# Patient Record
Sex: FEMALE | Race: WHITE | Employment: UNEMPLOYED | URBAN - METROPOLITAN AREA
[De-identification: names, ages, dates, MRNs, and addresses within clinical notes are randomized per-mention and may not be internally consistent; named-entity substitution may affect disease eponyms.]

---

## 2018-04-26 ENCOUNTER — OFFICE VISIT (OUTPATIENT)
Dept: FAMILY MEDICINE | Facility: CLINIC | Age: 43
End: 2018-04-26
Payer: COMMERCIAL

## 2018-04-26 VITALS
RESPIRATION RATE: 16 BRPM | WEIGHT: 162 LBS | HEART RATE: 84 BPM | BODY MASS INDEX: 29.81 KG/M2 | SYSTOLIC BLOOD PRESSURE: 122 MMHG | HEIGHT: 62 IN | DIASTOLIC BLOOD PRESSURE: 82 MMHG | TEMPERATURE: 98.2 F | OXYGEN SATURATION: 98 %

## 2018-04-26 DIAGNOSIS — Z00.00 ENCOUNTER FOR ROUTINE ADULT HEALTH EXAMINATION WITHOUT ABNORMAL FINDINGS: Primary | ICD-10-CM

## 2018-04-26 DIAGNOSIS — Z30.09 ENCOUNTER FOR OTHER GENERAL COUNSELING OR ADVICE ON CONTRACEPTION: ICD-10-CM

## 2018-04-26 DIAGNOSIS — Z01.411 ABNORMAL PELVIC EXAM: ICD-10-CM

## 2018-04-26 DIAGNOSIS — D50.0 IRON DEFICIENCY ANEMIA DUE TO CHRONIC BLOOD LOSS: ICD-10-CM

## 2018-04-26 DIAGNOSIS — N92.0 MENORRHAGIA WITH REGULAR CYCLE: ICD-10-CM

## 2018-04-26 DIAGNOSIS — C44.310 BCC (BASAL CELL CARCINOMA), FACE: ICD-10-CM

## 2018-04-26 LAB
CHOLEST SERPL-MCNC: 234 MG/DL
ERYTHROCYTE [DISTWIDTH] IN BLOOD BY AUTOMATED COUNT: 14 % (ref 10–15)
FERRITIN SERPL-MCNC: 4 NG/ML (ref 12–150)
GLUCOSE SERPL-MCNC: 101 MG/DL (ref 70–99)
HCT VFR BLD AUTO: 35.6 % (ref 35–47)
HDLC SERPL-MCNC: 59 MG/DL
HGB BLD-MCNC: 11.4 G/DL (ref 11.7–15.7)
LDLC SERPL CALC-MCNC: 144 MG/DL
MCH RBC QN AUTO: 27.6 PG (ref 26.5–33)
MCHC RBC AUTO-ENTMCNC: 32 G/DL (ref 31.5–36.5)
MCV RBC AUTO: 86 FL (ref 78–100)
NONHDLC SERPL-MCNC: 175 MG/DL
PLATELET # BLD AUTO: 299 10E9/L (ref 150–450)
RBC # BLD AUTO: 4.13 10E12/L (ref 3.8–5.2)
TRIGL SERPL-MCNC: 157 MG/DL
TSH SERPL DL<=0.005 MIU/L-ACNC: 1.02 MU/L (ref 0.4–4)
WBC # BLD AUTO: 4.8 10E9/L (ref 4–11)

## 2018-04-26 PROCEDURE — 36415 COLL VENOUS BLD VENIPUNCTURE: CPT | Performed by: FAMILY MEDICINE

## 2018-04-26 PROCEDURE — 99213 OFFICE O/P EST LOW 20 MIN: CPT | Mod: 25 | Performed by: FAMILY MEDICINE

## 2018-04-26 PROCEDURE — 82947 ASSAY GLUCOSE BLOOD QUANT: CPT | Performed by: FAMILY MEDICINE

## 2018-04-26 PROCEDURE — 84443 ASSAY THYROID STIM HORMONE: CPT | Performed by: FAMILY MEDICINE

## 2018-04-26 PROCEDURE — 85027 COMPLETE CBC AUTOMATED: CPT | Performed by: FAMILY MEDICINE

## 2018-04-26 PROCEDURE — 82728 ASSAY OF FERRITIN: CPT | Performed by: FAMILY MEDICINE

## 2018-04-26 PROCEDURE — 80061 LIPID PANEL: CPT | Performed by: FAMILY MEDICINE

## 2018-04-26 PROCEDURE — 99396 PREV VISIT EST AGE 40-64: CPT | Performed by: FAMILY MEDICINE

## 2018-04-26 ASSESSMENT — PAIN SCALES - GENERAL: PAINLEVEL: NO PAIN (0)

## 2018-04-26 NOTE — PATIENT INSTRUCTIONS
-Please scheduled to get a mammogram at your convenience.  -Continue to consider whether you are interested in getting an IUD, and let me know if you decide you would like one.  -It would be a good idea to schedule a skin check at your convenience.    Please call [facility] (formerly called [facility]) at [phone] to schedule mammogram and pelvic ultrasound      Start powdered fiber such as Metamucil or Citricel: start 1 tsp per day, and increase by 1 tsp per week to goal total dosing of 1-2 tablespoons per day. Drink plenty of water daily for fiber to be effective.       Preventive Health Recommendations  Female Ages 40 to 49    Yearly exam:     See your health care provider every year in order to  1. Review health changes.   2. Discuss preventive care.    3. Review your medicines if your doctor prescribed any.      Get a Pap test every three years (unless you have an abnormal result and your provider advises testing more often).      If you get Pap tests with HPV test, you only need to test every 5 years, unless you have an abnormal result. You do not need a Pap test if your uterus was removed (hysterectomy) and you have not had cancer.      You should be tested each year for STDs (sexually transmitted diseases), if you're at risk.       Ask your doctor if you should have a mammogram.      Have a colonoscopy (test for colon cancer) if someone in your family has had colon cancer or polyps before age 50.       Have a cholesterol test every 5 years.       Have a diabetes test (fasting glucose) after age 45. If you are at risk for diabetes, you should have this test every 3 years.    Shots: Get a flu shot each year. Get a tetanus shot every 10 years.     Nutrition:     Eat at least 5 servings of fruits and vegetables each day.    Eat whole-grain bread, whole-wheat pasta and brown rice instead of white grains and rice.    Talk to your provider about Calcium and  Vitamin D.     Lifestyle    Exercise at least 150 minutes a week (an average of 30 minutes a day, 5 days a week). This will help you control your weight and prevent disease.    Limit alcohol to one drink per day.    No smoking.     Wear sunscreen to prevent skin cancer.    See your dentist every six months for an exam and cleaning.

## 2018-04-26 NOTE — PROGRESS NOTES
SUBJECTIVE:   CC: Mat Lei is an 42 year old woman who presents for preventive health visit.     Heavy Menstruation:  Patient has recently begun using a menstrual cup. She says that on average she loses 5-6 ounces of blood with menstruation. When she used pads, it took her 3-4 hours to saturate a pad although she thinks her cycle is heavier now than when using the pads. With menstruation, her bleeding is heavy for the first 3 days and then is light for a few days, which she says has changed over the last year where she was initially having lighter, constant bleeding for 7 days. History of iron deficiency anemia.    Birth Control:  Patient is sexually active and uses condoms for birth control. She has used birth control pills in the past and reports problems with pain in her left shoulder while on the medication so doesn't want to go back to ocps. She and her  have decided that they would not like anymore children. They are considering permanent birth control. No history of IUD use, and she says she may be interested in trying one. Patient has had 4 children, and she had pre-eclampsia with her first child and delivered 3 weeks early through .    Swelling of Left Axilla:  Patient was recently menstruating when she developed pain and swelling under her left armpit. She has experienced swelling in her axilla in the past with colds, and says it resolves within a week or so. No changes in tenderness or masses in her breasts. Family history of breast cancer through her maternal grandfather. She has not ever had a mammogram, and asks when she should begin screening.    Weight:  Patient says that she has been trying to exercise regularly, but says that her motivation to exercise comes and goes. She doesn't check her weight, and says that she prefers to focus on healthy living. She eats a regular diet, and is not keeping track of what she eats.    History of Basal Cell Carcinoma:  Patient has a  "history \"within the last 5 years\" of a basal cell carcinoma over the left side of her forehead. She has had a Mohs procedure to remove the basal cell carcinoma. She has not been having annual skin checks.    Coccyx Pain:  Patient says that in early March she was skiing and fell on her tailbone. She had pain over her backside for 3 weeks. She says that her pain has since resolved, and has had no problems over the last month.    Healthy Habits:    Do you get at least three servings of calcium containing foods daily (dairy, green leafy vegetables, etc.)? yes    Amount of exercise or daily activities, outside of work: 2-5 day(s) per week    Problems taking medications regularly not applicable    Medication side effects: No    Have you had an eye exam in the past two years? no    Do you see a dentist twice per year? no    Do you have sleep apnea, excessive snoring or daytime drowsiness?no      Today's PHQ-2 Score:   PHQ-2 ( 1999 Pf[employer]) 4/26/2018 9/18/2014   Q1: Little interest or pleasure in doing things 1 0   Q2: Feeling down, depressed or hopeless 1 0   PHQ-2 Score 2 0       Abuse: Current or Past(Physical, Sexual or Emotional)- No  Do you feel safe in your environment - Yes    Social History   Substance Use Topics     Smoking status: Never Smoker     Smokeless tobacco: Never Used     Alcohol use No     If you drink alcohol do you typically have >3 drinks per day or >7 drinks per week? No                     Reviewed orders with patient.  Reviewed health maintenance and updated orders accordingly - Yes  Patient Active Problem List   Diagnosis     CARDIOVASCULAR SCREENING; LDL GOAL LESS THAN 160     BMI 30.0-30.9,adult     History reviewed. No pertinent surgical history.    Social History   Substance Use Topics     Smoking status: Never Smoker     Smokeless tobacco: Never Used     Alcohol use No     Family History   Problem Relation Age of Onset     Breast Cancer Maternal Grandfather      knows had breast cancer not " sure if thats where it started         Current Outpatient Prescriptions   Medication Sig Dispense Refill     Prenatal Vit-Fe Fumarate-FA (PRENATAL MULTIVITAMIN  PLUS IRON) 27-0.8 MG TABS Take 1 tablet by mouth daily 100 tablet 3     NO ACTIVE MEDICATIONS .       No Known Allergies    Patient under age 50, mutual decision reflected in health maintenance.      Pertinent mammograms are reviewed under the imaging tab.  History of abnormal Pap smear:   NO - age 30- 65 PAP every 3 years recommended  Last 3 Pap Results:   PAP (no units)   Date Value   10/03/2016 NIL   09/13/2012 NIL       Reviewed and updated as needed this visit by clinical staff  Tobacco  Allergies  Meds  Med Hx  Surg Hx  Fam Hx  Soc Hx        Reviewed and updated as needed this visit by Provider            ROS:  CONSTITUTIONAL: NEGATIVE for fever, chills, change in weight  INTEGUMENTARU/SKIN: NEGATIVE for worrisome rashes, moles or lesions  EYES: NEGATIVE for vision changes or irritation  ENT: NEGATIVE for ear, mouth and throat problems  RESP: NEGATIVE for significant cough or SOB  BREAST: POSITIVE for swelling of left axilla, now resolved NEGATIVE for tenderness or discharge  CV: NEGATIVE for chest pain, palpitations or peripheral edema  GI: NEGATIVE for nausea, abdominal pain, heartburn, or change in bowel habits  : NEGATIVE for unusual urinary or vaginal symptoms. Periods are regular.  MUSCULOSKELETAL: NEGATIVE for significant arthralgias or myalgia  NEURO: NEGATIVE for weakness, dizziness or paresthesias  PSYCHIATRIC: NEGATIVE for changes in mood or affect    This document serves as a record of the services and decisions personally performed and made by Lexy Deleon MD. It was created on his behalf by Wayne Correa, a trained medical scribe. The creation of this document is based on the provider's statements to the medical scribe.  Wayne Correa 8:52 AM April 26, 2018      OBJECTIVE:   /82 (BP Location: Right  "arm, Patient Position: Sitting, Cuff Size: Adult Regular)  Pulse 84  Temp 98.2  F (36.8  C) (Oral)  Resp 16  Ht 1.562 m (5' 1.5\")  Wt 73.5 kg (162 lb)  LMP 04/14/2018  SpO2 98%  BMI 30.11 kg/m2  EXAM:  GENERAL: healthy, alert and no distress  EYES: Eyes grossly normal to inspection, PERRL and conjunctivae and sclerae normal  HENT: ear canals and TM's normal, nose and mouth without ulcers or lesions  NECK: no adenopathy, no asymmetry, masses, or scars and thyroid normal to palpation  RESP: lungs clear to auscultation - no rales, rhonchi or wheezes  BREAST: normal without masses, tenderness or nipple discharge and no palpable axillary masses or adenopathy  CV: regular rate and rhythm, normal S1 S2, no S3 or S4, no murmur, click or rub, no peripheral edema and peripheral pulses strong  ABDOMEN: soft, nontender, no hepatosplenomegaly, no masses and bowel sounds normal   (female): uterus felt slightly enlarged and bulky, normal female external genitalia, normal urethral meatus, vaginal mucosa pink, moist, well rugated, and normal cervix/adnexa/uterus without masses or discharge  MS: no gross musculoskeletal defects noted, no edema  SKIN: mild hyperpigmentation of axilla  NEURO: Normal strength and tone, mentation intact and speech normal  PSYCH: mentation appears normal, affect normal/bright  LYMPH: no cervical, supraclavicular, axillary, or inguinal adenopathy    ASSESSMENT/PLAN:   1. Encounter for routine adult health examination without abnormal findings  - *MA Screening Digital Bilateral; Future  - Lipid panel reflex to direct LDL Fasting  - Glucose    2. Menorrhagia with regular cycle  - TSH with free T4 reflex  - CBC with platelets  - Ferritin  - US Pelvic Complete w Transvaginal; Future    3. BCC (basal cell carcinoma), face  Encouraged patient to follow through with annual skin check with derm- she will call to schedule (declines referral today)    4. Encounter for other general counseling or advice on " contraception  Discussed Mirena IUD, vasectomy, tubal ligation, etc and patient will consider. She and her partner have decided that they do not want more children. She will call if wanting to schedule Mirena insertion    5. Abnormal pelvic exam  - US Pelvic Complete w Transvaginal; Future    Patient Instructions   -Please scheduled to get a mammogram at your convenience.  -Continue to consider whether you are interested in getting an IUD, and let me know if you decide you would like one.  -It would be a good idea to schedule a skin check at your convenience.    Please call [facility] (formerly [facility]) at [phone] to schedule mammogram and pelvic ultrasound      Start powdered fiber such as Metamucil or Citricel: start 1 tsp per day, and increase by 1 tsp per week to goal total dosing of 1-2 tablespoons per day. Drink plenty of water daily for fiber to be effective.       Preventive Health Recommendations  Female Ages 40 to 49    Yearly exam:     See your health care provider every year in order to  1. Review health changes.   2. Discuss preventive care.    3. Review your medicines if your doctor prescribed any.      Get a Pap test every three years (unless you have an abnormal result and your provider advises testing more often).      If you get Pap tests with HPV test, you only need to test every 5 years, unless you have an abnormal result. You do not need a Pap test if your uterus was removed (hysterectomy) and you have not had cancer.      You should be tested each year for STDs (sexually transmitted diseases), if you're at risk.       Ask your doctor if you should have a mammogram.      Have a colonoscopy (test for colon cancer) if someone in your family has had colon cancer or polyps before age 50.       Have a cholesterol test every 5 years.       Have a diabetes test (fasting glucose) after age 45. If you are at risk for diabetes, you should have  "this test every 3 years.    Shots: Get a flu shot each year. Get a tetanus shot every 10 years.     Nutrition:     Eat at least 5 servings of fruits and vegetables each day.    Eat whole-grain bread, whole-wheat pasta and brown rice instead of white grains and rice.    Talk to your provider about Calcium and Vitamin D.     Lifestyle    Exercise at least 150 minutes a week (an average of 30 minutes a day, 5 days a week). This will help you control your weight and prevent disease.    Limit alcohol to one drink per day.    No smoking.     Wear sunscreen to prevent skin cancer.    See your dentist every six months for an exam and cleaning.      COUNSELING:   Reviewed preventive health counseling, as reflected in patient instructions     reports that she has never smoked. She has never used smokeless tobacco.  Estimated body mass index is 30.11 kg/(m^2) as calculated from the following:    Height as of this encounter: 1.562 m (5' 1.5\").    Weight as of this encounter: 73.5 kg (162 lb).   Weight management plan: Encouraged regular exercise and eating a healthy diet.    Counseling Resources:  ATP IV Guidelines  Pooled Cohorts Equation Calculator  Breast Cancer Risk Calculator  FRAX Risk Assessment  ICSI Preventive Guidelines  Dietary Guidelines for Americans, 2010  USDA's MyPlate  ASA Prophylaxis  Lung CA Screening    The information in this document, created by the medical scribe for me, accurately reflects the services I personally performed and the decisions made by me. I have reviewed and approved this document for accuracy prior to leaving the patient care area.   Lexy Deleon MD 8:47 AM April 26, 2018  [facility]  "

## 2018-04-26 NOTE — MR AVS SNAPSHOT
After Visit Summary   4/26/2018    Mat Lei    MRN: [id number]           Patient Information     Date Of Birth          1975        Visit Information        Provider Department      4/26/2018 8:20 AM Lexy Deleon MD [facility]        Today's Diagnoses     Encounter for routine adult health examination without abnormal findings    -  1    Menorrhagia with regular cycle        BCC (basal cell carcinoma), face        Encounter for other general counseling or advice on contraception        Abnormal pelvic exam          Care Instructions    -Please scheduled to get a mammogram at your convenience.  -Continue to consider whether you are interested in getting an IUD, and let me know if you decide you would like one.  -It would be a good idea to schedule a skin check at your convenience.    Please call [facility] (formerly called [facility]) at [phone] to schedule mammogram and pelvic ultrasound      Start powdered fiber such as Metamucil or Citricel: start 1 tsp per day, and increase by 1 tsp per week to goal total dosing of 1-2 tablespoons per day. Drink plenty of water daily for fiber to be effective.       Preventive Health Recommendations  Female Ages 40 to 49    Yearly exam:     See your health care provider every year in order to  1. Review health changes.   2. Discuss preventive care.    3. Review your medicines if your doctor prescribed any.      Get a Pap test every three years (unless you have an abnormal result and your provider advises testing more often).      If you get Pap tests with HPV test, you only need to test every 5 years, unless you have an abnormal result. You do not need a Pap test if your uterus was removed (hysterectomy) and you have not had cancer.      You should be tested each year for STDs (sexually transmitted diseases), if you're at risk.       Ask your doctor if you should have a  mammogram.      Have a colonoscopy (test for colon cancer) if someone in your family has had colon cancer or polyps before age 50.       Have a cholesterol test every 5 years.       Have a diabetes test (fasting glucose) after age 45. If you are at risk for diabetes, you should have this test every 3 years.    Shots: Get a flu shot each year. Get a tetanus shot every 10 years.     Nutrition:     Eat at least 5 servings of fruits and vegetables each day.    Eat whole-grain bread, whole-wheat pasta and brown rice instead of white grains and rice.    Talk to your provider about Calcium and Vitamin D.     Lifestyle    Exercise at least 150 minutes a week (an average of 30 minutes a day, 5 days a week). This will help you control your weight and prevent disease.    Limit alcohol to one drink per day.    No smoking.     Wear sunscreen to prevent skin cancer.    See your dentist every six months for an exam and cleaning.          Follow-ups after your visit        Future tests that were ordered for you today     Open Future Orders        Priority Expected Expires Ordered    US Pelvic Complete w Transvaginal Routine  4/26/2019 4/26/2018    *MA Screening Digital Bilateral Routine  4/26/2019 4/26/2018            Who to contact     If you have questions or need follow up information about today's clinic visit or your schedule please contact [facility] directly at [phone].  Normal or non-critical lab and imaging results will be communicated to you by MyChart, letter or phone within 4 business days after the clinic has received the results. If you do not hear from us within 7 days, please contact the clinic through MyChart or phone. If you have a critical or abnormal lab result, we will notify you by phone as soon as possible.  Submit refill requests through [employer] or call your pharmacy and they will forward the refill request to us. Please allow 3 business days for your refill to be completed.           "Additional Information About Your Visit        MyChart Information     [employer] gives you secure access to your electronic health record. If you see a primary care provider, you can also send messages to your care team and make appointments. If you have questions, please call your primary care clinic.  If you do not have a primary care provider, please call [phone] and they will assist you.        Care EveryWhere ID     This is your Care EveryWhere ID. This could be used by other organizations to access your [place] medical records  [id number]        Your Vitals Were     Pulse Temperature Respirations Height Last Period Pulse Oximetry    84 98.2  F (36.8  C) (Oral) 16 1.562 m (5' 1.5\") 04/14/2018 98%    BMI (Body Mass Index)                   30.11 kg/m2            Blood Pressure from Last 3 Encounters:   04/26/18 122/82   10/03/16 108/76   09/18/14 111/78    Weight from Last 3 Encounters:   04/26/18 73.5 kg (162 lb)   10/03/16 73.9 kg (163 lb)   09/18/14 68.8 kg (151 lb 11.2 oz)              We Performed the Following     CBC with platelets     Ferritin     Glucose     Lipid panel reflex to direct LDL Fasting     TSH with free T4 reflex        Primary Care Provider Office Phone # Fax #    Enrique Cutler MD PhD [phone]       [address]  [facility] [address]        Equal Access to Services     [facility]: Hadii sunita ku hadasho Sonasreen, waaxda luqadaha, qaybta kaalmada lara, odessa mosley . So [facility] [phone].    ATENCIÓN: Si habla español, tiene a anguiano disposición servicios gratuitos de asistencia lingüística. Rica al [phone].    We comply with applicable federal civil rights laws and [place] laws. We do not discriminate on the basis of race, color, national origin, age, disability, sex, sexual orientation, or gender identity.            Thank you!     Thank you for choosing [facility]  for your care. Our goal is always to provide " you with excellent care. Hearing back from our patients is one way we can continue to improve our services. Please take a few minutes to complete the written survey that you may receive in the mail after your visit with us. Thank you!             Your Updated Medication List - Protect others around you: Learn how to safely use, store and throw away your medicines at www.disposemymeds.org.          This list is accurate as of 4/26/18  9:26 AM.  Always use your most recent med list.                   Brand Name Dispense Instructions for use Diagnosis    NO ACTIVE MEDICATIONS      .        prenatal multivitamin plus iron 27-0.8 MG Tabs per tablet     100 tablet    Take 1 tablet by mouth daily    Anemia, unspecified type

## 2018-04-27 PROBLEM — D50.0 IRON DEFICIENCY ANEMIA DUE TO CHRONIC BLOOD LOSS: Status: ACTIVE | Noted: 2018-04-27

## 2018-04-27 PROBLEM — R73.01 ELEVATED FASTING GLUCOSE: Status: ACTIVE | Noted: 2018-04-27

## 2018-05-29 ENCOUNTER — RADIANT APPOINTMENT (OUTPATIENT)
Dept: MAMMOGRAPHY | Facility: CLINIC | Age: 43
End: 2018-05-29
Attending: FAMILY MEDICINE
Payer: COMMERCIAL

## 2018-05-29 ENCOUNTER — RADIANT APPOINTMENT (OUTPATIENT)
Dept: ULTRASOUND IMAGING | Facility: CLINIC | Age: 43
End: 2018-05-29
Attending: FAMILY MEDICINE
Payer: COMMERCIAL

## 2018-05-29 DIAGNOSIS — N92.0 MENORRHAGIA WITH REGULAR CYCLE: ICD-10-CM

## 2018-05-29 DIAGNOSIS — Z00.00 ENCOUNTER FOR ROUTINE ADULT HEALTH EXAMINATION WITHOUT ABNORMAL FINDINGS: ICD-10-CM

## 2018-05-29 DIAGNOSIS — Z01.411 ABNORMAL PELVIC EXAM: ICD-10-CM

## 2018-05-29 DIAGNOSIS — R93.89 ABNORMAL ULTRASOUND OF PELVIS: Primary | ICD-10-CM

## 2018-05-29 PROCEDURE — 77063 BREAST TOMOSYNTHESIS BI: CPT

## 2018-05-29 PROCEDURE — 77067 SCR MAMMO BI INCL CAD: CPT

## 2018-05-29 PROCEDURE — 76856 US EXAM PELVIC COMPLETE: CPT | Performed by: RADIOLOGY

## 2018-05-29 PROCEDURE — 76830 TRANSVAGINAL US NON-OB: CPT | Performed by: RADIOLOGY

## 2018-06-08 ENCOUNTER — RADIANT APPOINTMENT (OUTPATIENT)
Dept: MAMMOGRAPHY | Facility: CLINIC | Age: 43
End: 2018-06-08
Attending: FAMILY MEDICINE
Payer: COMMERCIAL

## 2018-06-08 ENCOUNTER — RADIANT APPOINTMENT (OUTPATIENT)
Dept: ULTRASOUND IMAGING | Facility: CLINIC | Age: 43
End: 2018-06-08
Attending: FAMILY MEDICINE
Payer: COMMERCIAL

## 2018-06-08 DIAGNOSIS — R92.8 ABNORMAL MAMMOGRAM: ICD-10-CM

## 2018-06-08 PROCEDURE — 77065 DX MAMMO INCL CAD UNI: CPT | Mod: RT | Performed by: RADIOLOGY

## 2018-06-08 PROCEDURE — 76642 ULTRASOUND BREAST LIMITED: CPT | Mod: RT | Performed by: RADIOLOGY

## 2018-06-20 ENCOUNTER — TELEPHONE (OUTPATIENT)
Dept: GENERAL RADIOLOGY | Facility: CLINIC | Age: 43
End: 2018-06-20

## 2018-06-20 NOTE — TELEPHONE ENCOUNTER
Called pt to schedule recommended breast biopsy. No answer. Left message with contact information for pt to return call.

## 2018-07-02 ENCOUNTER — RADIANT APPOINTMENT (OUTPATIENT)
Dept: MAMMOGRAPHY | Facility: CLINIC | Age: 43
End: 2018-07-02
Attending: FAMILY MEDICINE
Payer: COMMERCIAL

## 2018-07-02 ENCOUNTER — RADIANT APPOINTMENT (OUTPATIENT)
Dept: ULTRASOUND IMAGING | Facility: CLINIC | Age: 43
End: 2018-07-02
Attending: FAMILY MEDICINE
Payer: COMMERCIAL

## 2018-07-02 DIAGNOSIS — N63.0 BREAST MASS: Primary | ICD-10-CM

## 2018-07-02 DIAGNOSIS — R92.8 ABNORMAL MAMMOGRAM: ICD-10-CM

## 2018-07-02 PROCEDURE — 19083 BX BREAST 1ST LESION US IMAG: CPT | Mod: RT | Performed by: RADIOLOGY

## 2018-07-02 PROCEDURE — 88305 TISSUE EXAM BY PATHOLOGIST: CPT | Performed by: FAMILY MEDICINE

## 2018-07-05 LAB — COPATH REPORT: NORMAL

## 2018-07-06 ENCOUNTER — TELEPHONE (OUTPATIENT)
Dept: GENERAL RADIOLOGY | Facility: CLINIC | Age: 43
End: 2018-07-06

## 2018-07-06 NOTE — TELEPHONE ENCOUNTER
Spoke with patient regarding right breast biopsy results, which indicate benign fibroadenomatous change without evidence of atypia or malignancy. Notified pt that the radiologist recommendation is continued yearly screening mammogram. Pt verbalized understanding of these results and all questions answered to her satisfaction.

## 2018-12-06 ENCOUNTER — DOCUMENTATION ONLY (OUTPATIENT)
Dept: LAB | Facility: CLINIC | Age: 43
End: 2018-12-06

## 2018-12-06 NOTE — PROGRESS NOTES
This patient has overdue labs. A letter was sent on 11/1/2018 and there has been no lab appointment made. If you still want these labs done, please have your care team contact the patient to make a lab appointment. Otherwise, please have the labs discontinued and close the encounter.    Thank you,  [facility]

## 2019-03-17 ASSESSMENT — ENCOUNTER SYMPTOMS
NAUSEA: 0
ARTHRALGIAS: 0
MYALGIAS: 0
DYSURIA: 0
DIARRHEA: 0
PARESTHESIAS: 0
CHILLS: 0
DIZZINESS: 0
FEVER: 0
PALPITATIONS: 0
BREAST MASS: 0
JOINT SWELLING: 0
ABDOMINAL PAIN: 0
SHORTNESS OF BREATH: 0
EYE PAIN: 0
FREQUENCY: 0
HEMATURIA: 0
HEMATOCHEZIA: 0
HEADACHES: 0
WEAKNESS: 0
HEARTBURN: 0
NERVOUS/ANXIOUS: 0
SORE THROAT: 0
CONSTIPATION: 0
COUGH: 0

## 2019-03-18 ENCOUNTER — OFFICE VISIT (OUTPATIENT)
Dept: FAMILY MEDICINE | Facility: CLINIC | Age: 44
End: 2019-03-18
Payer: COMMERCIAL

## 2019-03-18 VITALS
SYSTOLIC BLOOD PRESSURE: 116 MMHG | HEIGHT: 62 IN | RESPIRATION RATE: 16 BRPM | OXYGEN SATURATION: 100 % | HEART RATE: 72 BPM | BODY MASS INDEX: 29.63 KG/M2 | DIASTOLIC BLOOD PRESSURE: 77 MMHG | TEMPERATURE: 98.2 F | WEIGHT: 161 LBS

## 2019-03-18 DIAGNOSIS — Z01.411 ABNORMAL PELVIC EXAM: ICD-10-CM

## 2019-03-18 DIAGNOSIS — Z78.9 VEGAN DIET: ICD-10-CM

## 2019-03-18 DIAGNOSIS — Z12.39 SCREENING FOR BREAST CANCER: ICD-10-CM

## 2019-03-18 DIAGNOSIS — R73.01 ELEVATED FASTING GLUCOSE: ICD-10-CM

## 2019-03-18 DIAGNOSIS — C44.310 BCC (BASAL CELL CARCINOMA), FACE: ICD-10-CM

## 2019-03-18 DIAGNOSIS — D50.0 IRON DEFICIENCY ANEMIA DUE TO CHRONIC BLOOD LOSS: ICD-10-CM

## 2019-03-18 DIAGNOSIS — Z00.00 ENCOUNTER FOR ROUTINE ADULT HEALTH EXAMINATION WITHOUT ABNORMAL FINDINGS: Primary | ICD-10-CM

## 2019-03-18 LAB
BASOPHILS # BLD AUTO: 0 10E9/L (ref 0–0.2)
BASOPHILS NFR BLD AUTO: 0.2 %
CHOLEST SERPL-MCNC: 186 MG/DL
DIFFERENTIAL METHOD BLD: NORMAL
EOSINOPHIL # BLD AUTO: 0.1 10E9/L (ref 0–0.7)
EOSINOPHIL NFR BLD AUTO: 1.3 %
ERYTHROCYTE [DISTWIDTH] IN BLOOD BY AUTOMATED COUNT: 13.4 % (ref 10–15)
FERRITIN SERPL-MCNC: 14 NG/ML (ref 12–150)
GLUCOSE SERPL-MCNC: 99 MG/DL (ref 70–99)
HCT VFR BLD AUTO: 40.5 % (ref 35–47)
HDLC SERPL-MCNC: 47 MG/DL
HGB BLD-MCNC: 13.3 G/DL (ref 11.7–15.7)
LDLC SERPL CALC-MCNC: 106 MG/DL
LYMPHOCYTES # BLD AUTO: 2 10E9/L (ref 0.8–5.3)
LYMPHOCYTES NFR BLD AUTO: 36.5 %
MCH RBC QN AUTO: 30.7 PG (ref 26.5–33)
MCHC RBC AUTO-ENTMCNC: 32.8 G/DL (ref 31.5–36.5)
MCV RBC AUTO: 94 FL (ref 78–100)
MONOCYTES # BLD AUTO: 0.3 10E9/L (ref 0–1.3)
MONOCYTES NFR BLD AUTO: 4.6 %
NEUTROPHILS # BLD AUTO: 3.1 10E9/L (ref 1.6–8.3)
NEUTROPHILS NFR BLD AUTO: 57.4 %
NONHDLC SERPL-MCNC: 139 MG/DL
PLATELET # BLD AUTO: 258 10E9/L (ref 150–450)
RBC # BLD AUTO: 4.33 10E12/L (ref 3.8–5.2)
TRIGL SERPL-MCNC: 166 MG/DL
VIT B12 SERPL-MCNC: 382 PG/ML (ref 193–986)
WBC # BLD AUTO: 5.4 10E9/L (ref 4–11)

## 2019-03-18 PROCEDURE — 82947 ASSAY GLUCOSE BLOOD QUANT: CPT | Performed by: FAMILY MEDICINE

## 2019-03-18 PROCEDURE — 85025 COMPLETE CBC W/AUTO DIFF WBC: CPT | Performed by: FAMILY MEDICINE

## 2019-03-18 PROCEDURE — 82607 VITAMIN B-12: CPT | Performed by: FAMILY MEDICINE

## 2019-03-18 PROCEDURE — 80061 LIPID PANEL: CPT | Performed by: FAMILY MEDICINE

## 2019-03-18 PROCEDURE — 82728 ASSAY OF FERRITIN: CPT | Performed by: FAMILY MEDICINE

## 2019-03-18 PROCEDURE — 99396 PREV VISIT EST AGE 40-64: CPT | Performed by: FAMILY MEDICINE

## 2019-03-18 PROCEDURE — 36415 COLL VENOUS BLD VENIPUNCTURE: CPT | Performed by: FAMILY MEDICINE

## 2019-03-18 ASSESSMENT — ENCOUNTER SYMPTOMS
DIZZINESS: 0
HEMATOCHEZIA: 0
CHILLS: 0
PALPITATIONS: 0
SORE THROAT: 0
HEADACHES: 0
ARTHRALGIAS: 0
JOINT SWELLING: 0
NERVOUS/ANXIOUS: 0
DYSURIA: 0
HEMATURIA: 0
WEAKNESS: 0
HEARTBURN: 0
ABDOMINAL PAIN: 0
CONSTIPATION: 0
EYE PAIN: 0
NAUSEA: 0
FREQUENCY: 0
COUGH: 0
BREAST MASS: 0
FEVER: 0
DIARRHEA: 0
MYALGIAS: 0
SHORTNESS OF BREATH: 0
PARESTHESIAS: 0

## 2019-03-18 ASSESSMENT — PAIN SCALES - GENERAL: PAINLEVEL: NO PAIN (0)

## 2019-03-18 ASSESSMENT — MIFFLIN-ST. JEOR: SCORE: 1330.6

## 2019-03-18 NOTE — PROGRESS NOTES
SUBJECTIVE:   CC: Mat Lei is an 43 year old woman who presents for preventive health visit.     Physical   Annual:     Getting at least 3 servings of Calcium per day:  Yes    Bi-annual eye exam:  NO    Dental care twice a year:  NO    Sleep apnea or symptoms of sleep apnea:  None    Diet:  Vegetarian/vegan    Frequency of exercise:  1 day/week    Duration of exercise:  Other    Taking medications regularly:  Not Applicable    Medication side effects:  Not applicable    PHQ-2 Total Score: 0    -Patient has changed her diet to be more whole grain and vegan; she has cut out meat and dairy. She is taking iron and B12 since starting this diet. She takes iron every day she is menstruating, a few days after her period, and some days in between periods.   -Has not been exercising consistently, now that she is making dietary changes she is planning on changing her exercise habits next   -Still seeing dermatology regularly at [facility]     Menstrual cycle:  -Periods have been shorter than they used to, 4-5 days instead of 8, but bleeding is still fairly heavy though this has not worsened. Typically the second day is heaviest, saturating a pad/tampon in a couple hours. She has not had bad cramping which is an improvement  -She is using condoms for contraception but her  is considering a vasectomy but has not been able to schedule time off work for this   intermenstrual spotting has resolved.       Today's PHQ-2 Score:   PHQ-2 ( 1999 [employer]) 3/17/2019   Q1: Little interest or pleasure in doing things 0   Q2: Feeling down, depressed or hopeless 0   PHQ-2 Score 0   Q1: Little interest or pleasure in doing things Not at all   Q2: Feeling down, depressed or hopeless Not at all   PHQ-2 Score 0       Abuse: Current or Past(Physical, Sexual or Emotional)- No  Do you feel safe in your environment? Yes    Social History     Tobacco Use     Smoking status: Never Smoker     Smokeless tobacco:  Never Used   Substance Use Topics     Alcohol use: No     Alcohol Use 3/17/2019   If you drink alcohol do you typically have greater than 3 drinks per day OR greater than 7 drinks per week? Not Applicable       Reviewed orders with patient.  Reviewed health maintenance and updated orders accordingly - Yes  Patient Active Problem List   Diagnosis     CARDIOVASCULAR SCREENING; LDL GOAL LESS THAN 160     BMI 30.0-30.9,adult     BCC (basal cell carcinoma), face     Elevated fasting glucose     Iron deficiency anemia due to chronic blood loss     Vegan diet     Past Surgical History:   Procedure Laterality Date     C ORAL SURGERY PROCEDURE      wisdom teeth     C/SECTION, LOW TRANSVERSE      , Low Transverse     MOHS MICROGRAPHIC PROCEDURE      BCC- forehead       Social History     Tobacco Use     Smoking status: Never Smoker     Smokeless tobacco: Never Used   Substance Use Topics     Alcohol use: No     Family History   Problem Relation Age of Onset     Transient ischemic attack Father      Breast Cancer Maternal Grandfather         knows had breast cancer not sure if thats where it started           Mammogram Screening: Patient under age 50, mutual decision reflected in health maintenance.      Pertinent mammograms are reviewed under the imaging tab.  History of abnormal Pap smear: NO - age 30-65 PAP every 5 years with negative HPV co-testing recommended  PAP / HPV Latest Ref Rng & Units 10/3/2016 2012   PAP - NIL NIL   HPV 16 DNA NEG Negative -   HPV 18 DNA NEG Negative -   OTHER HR HPV NEG Negative -     Reviewed and updated as needed this visit by clinical staff  Tobacco  Allergies  Meds  Med Hx  Surg Hx  Fam Hx  Soc Hx        Reviewed and updated as needed this visit by Provider            Review of Systems   Constitutional: Negative for chills and fever.   HENT: Negative for congestion, ear pain, hearing loss and sore throat.    Eyes: Negative for pain and visual disturbance.   Respiratory:  "Negative for cough and shortness of breath.    Cardiovascular: Negative for chest pain, palpitations and peripheral edema.   Gastrointestinal: Negative for abdominal pain, constipation, diarrhea, heartburn, hematochezia and nausea.   Breasts:  Negative for tenderness, breast mass and discharge.   Genitourinary: Negative for dysuria, frequency, genital sores, hematuria, pelvic pain, urgency, vaginal bleeding and vaginal discharge.   Musculoskeletal: Negative for arthralgias, joint swelling and myalgias.   Skin: Negative for rash.   Neurological: Negative for dizziness, weakness, headaches and paresthesias.   Psychiatric/Behavioral: Negative for mood changes. The patient is not nervous/anxious.        This document serves as a record of the services and decisions personally performed by JENI SMITH. It was created on his/her behalf by Brittnee Ernandez, a trained medical scribe. The creation of this document is based on the provider's statements to the medical scribe. Brittnee Ernandez, March 18, 2019 8:45 AM  OBJECTIVE:   /77 (BP Location: Right arm, Patient Position: Chair, Cuff Size: Adult Large)   Pulse 72   Temp 98.2  F (36.8  C) (Oral)   Resp 16   Ht 1.562 m (5' 1.5\")   Wt 73 kg (161 lb)   LMP 02/19/2019 (Approximate)   SpO2 100%   Breastfeeding? No   BMI 29.93 kg/m    Physical Exam  GENERAL: healthy, alert and no distress  EYES: Eyes grossly normal to inspection, PERRL and conjunctivae and sclerae normal  HENT: non-obstructing cerumen bilaterally. Crowded oropharynx. ear canals and TM's normal, nose and mouth without ulcers or lesions  NECK: no adenopathy, no asymmetry, masses, or scars and thyroid normal to palpation  RESP: lungs clear to auscultation - no rales, rhonchi or wheezes  BREAST: normal without masses, tenderness or nipple discharge and no palpable axillary masses or adenopathy  CV: regular rate and rhythm, normal S1 S2, no S3 or S4, no murmur, click or rub, no peripheral edema " and peripheral pulses strong  ABDOMEN: soft, nontender, no hepatosplenomegaly, no masses and bowel sounds normal   (female): normal female external genitalia, normal urethral meatus, vaginal mucosa pink, moist, well rugated, and normal cervix/adnexa/uterus without masses or discharge  MS: no gross musculoskeletal defects noted, no edema  SKIN: no suspicious lesions or rashes, thick vurucca plantar aspect foot  NEURO: Normal strength and tone, mentation intact and speech normal  PSYCH: mentation appears normal, affect normal/bright      07/02/18 US Breast Biopsy:   Pathology result of benign breast tissue with  fibroadenomatous change and mild focal chronic inflammation is  concordant with imaging    5/29/18 US Pelvic:  IMPRESSION:   1.  The endometrium is homogeneous and measures within normal limits  at 8 mm. There is equivocal increased blood flow in the endometrium at  the fundal region. If the patient's symptoms persist, consider  short-term follow-up ultrasound in 6-8 weeks  ASSESSMENT/PLAN:   1. Encounter for routine adult health examination without abnormal findings  Previous negative HIV screen in pregnancy, no risk for exposure since then. Declined STD screen  - Lipid panel reflex to direct LDL Fasting  - Glucose    2. Screening for breast cancer  - *MA Screening Digital Bilateral; Future    3. Vegan diet  Continue vitamin B12 supplement  - Vitamin B12    4. Iron deficiency anemia due to chronic blood loss  Continue to monitor, advised patient to take daily multi-vitamin with iron and additional iron supplement     5. Elevated fasting glucose  A1c if glucose is elevated  - Glucose  - JUST IN CASE    6. BCC (basal cell carcinoma), face  No worrisome lesions noted today. Followed by dermatology     Improved menstrual issues so will not pursue f/u us. However, if new bleeding concerns arise would need repeat imaging.       COUNSELING:  Reviewed preventive health counseling, as reflected in patient  "instructions  Special attention given to:        Regular exercise       Healthy diet/nutrition       Vision screening       Hearing screening       Alcohol Use       Contraception       Family planning       Safe sex practices/STD prevention       HIV screeninx in teen years, 1x in adult years, and at intervals if high risk    BP Readings from Last 1 Encounters:   19 116/77     Estimated body mass index is 29.93 kg/m  as calculated from the following:    Height as of this encounter: 1.562 m (5' 1.5\").    Weight as of this encounter: 73 kg (161 lb).      Weight management plan: Discussed healthy diet and exercise guidelines     reports that  has never smoked. she has never used smokeless tobacco.    Patient Instructions   Consider starting a multi-vitamin.     Calculate how much calcium you are getting in your diet. Supplement the rest for a total 1000 mg daily.     Please call [facility] ([facility] at [phone] to schedule your mammogram.    Soak your foot and then rub the skin with a pumice stone. You can then apply salicylic acid.       Counseling Resources:  ATP IV Guidelines  Pooled Cohorts Equation Calculator  Breast Cancer Risk Calculator  FRAX Risk Assessment  ICSI Preventive Guidelines  Dietary Guidelines for Americans, 2010  USDA's MyPlate  ASA Prophylaxis  Lung CA Screening    The information in this document, created by the medical scribe for me, accurately reflects the services I personally performed and the decisions made by me. I have reviewed and approved this document for accuracy.   Lexy Deleon MD  [facility]"

## 2019-03-18 NOTE — PATIENT INSTRUCTIONS
Consider starting a multi-vitamin.     Calculate how much calcium you are getting in your diet. Supplement the rest for a total 1000 mg daily.     Please call [facility] (formerly called [facility]) at [phone] to schedule your mammogram.    Soak your foot and then rub the skin with a pumice stone. You can then apply salicylic acid.

## 2019-05-25 ENCOUNTER — APPOINTMENT (OUTPATIENT)
Dept: CT IMAGING | Facility: CLINIC | Age: 44
End: 2019-05-25
Attending: EMERGENCY MEDICINE
Payer: COMMERCIAL

## 2019-05-25 ENCOUNTER — HOSPITAL ENCOUNTER (EMERGENCY)
Facility: CLINIC | Age: 44
Discharge: HOME | End: 2019-05-25
Attending: EMERGENCY MEDICINE | Admitting: EMERGENCY MEDICINE
Payer: COMMERCIAL

## 2019-05-25 VITALS
HEIGHT: 62 IN | BODY MASS INDEX: 30.18 KG/M2 | DIASTOLIC BLOOD PRESSURE: 87 MMHG | RESPIRATION RATE: 12 BRPM | WEIGHT: 164 LBS | OXYGEN SATURATION: 98 % | TEMPERATURE: 97.8 F | SYSTOLIC BLOOD PRESSURE: 121 MMHG | HEART RATE: 88 BPM

## 2019-05-25 DIAGNOSIS — R10.9 FLANK PAIN: ICD-10-CM

## 2019-05-25 LAB
ALBUMIN UR-MCNC: 10 MG/DL
ANION GAP SERPL CALCULATED.3IONS-SCNC: 8 MMOL/L (ref 3–14)
APPEARANCE UR: ABNORMAL
BASOPHILS # BLD AUTO: 0 10E9/L (ref 0–0.2)
BASOPHILS NFR BLD AUTO: 0.2 %
BILIRUB UR QL STRIP: NEGATIVE
BUN SERPL-MCNC: 12 MG/DL (ref 7–30)
CALCIUM SERPL-MCNC: 8.5 MG/DL (ref 8.5–10.1)
CHLORIDE SERPL-SCNC: 102 MMOL/L (ref 94–109)
CO2 SERPL-SCNC: 27 MMOL/L (ref 20–32)
COLOR UR AUTO: YELLOW
CREAT SERPL-MCNC: 0.7 MG/DL (ref 0.52–1.04)
DIFFERENTIAL METHOD BLD: ABNORMAL
EOSINOPHIL # BLD AUTO: 0 10E9/L (ref 0–0.7)
EOSINOPHIL NFR BLD AUTO: 0.3 %
ERYTHROCYTE [DISTWIDTH] IN BLOOD BY AUTOMATED COUNT: 12.9 % (ref 10–15)
GFR SERPL CREATININE-BSD FRML MDRD: >90 ML/MIN/{1.73_M2}
GLUCOSE SERPL-MCNC: 145 MG/DL (ref 70–99)
GLUCOSE UR STRIP-MCNC: 70 MG/DL
HCT VFR BLD AUTO: 38.4 % (ref 35–47)
HGB BLD-MCNC: 11.9 G/DL (ref 11.7–15.7)
HGB UR QL STRIP: ABNORMAL
IMM GRANULOCYTES # BLD: 0.1 10E9/L (ref 0–0.4)
IMM GRANULOCYTES NFR BLD: 0.4 %
KETONES UR STRIP-MCNC: 5 MG/DL
LEUKOCYTE ESTERASE UR QL STRIP: NEGATIVE
LYMPHOCYTES # BLD AUTO: 1.7 10E9/L (ref 0.8–5.3)
LYMPHOCYTES NFR BLD AUTO: 14.4 %
MCH RBC QN AUTO: 29.4 PG (ref 26.5–33)
MCHC RBC AUTO-ENTMCNC: 31 G/DL (ref 31.5–36.5)
MCV RBC AUTO: 95 FL (ref 78–100)
MONOCYTES # BLD AUTO: 0.3 10E9/L (ref 0–1.3)
MONOCYTES NFR BLD AUTO: 2.9 %
MUCOUS THREADS #/AREA URNS LPF: PRESENT /LPF
NEUTROPHILS # BLD AUTO: 9.6 10E9/L (ref 1.6–8.3)
NEUTROPHILS NFR BLD AUTO: 81.8 %
NITRATE UR QL: NEGATIVE
NRBC # BLD AUTO: 0 10*3/UL
NRBC BLD AUTO-RTO: 0 /100
PH UR STRIP: 7 PH (ref 5–7)
PLATELET # BLD AUTO: 273 10E9/L (ref 150–450)
POTASSIUM SERPL-SCNC: 3.4 MMOL/L (ref 3.4–5.3)
RBC # BLD AUTO: 4.05 10E12/L (ref 3.8–5.2)
RBC #/AREA URNS AUTO: >182 /HPF (ref 0–2)
SODIUM SERPL-SCNC: 137 MMOL/L (ref 133–144)
SOURCE: ABNORMAL
SP GR UR STRIP: 1.02 (ref 1–1.03)
SQUAMOUS #/AREA URNS AUTO: 7 /HPF (ref 0–1)
UROBILINOGEN UR STRIP-MCNC: NORMAL MG/DL (ref 0–2)
WBC # BLD AUTO: 11.8 10E9/L (ref 4–11)
WBC #/AREA URNS AUTO: 2 /HPF (ref 0–5)

## 2019-05-25 PROCEDURE — 25000128 H RX IP 250 OP 636: Performed by: EMERGENCY MEDICINE

## 2019-05-25 PROCEDURE — 85025 COMPLETE CBC W/AUTO DIFF WBC: CPT | Performed by: EMERGENCY MEDICINE

## 2019-05-25 PROCEDURE — 99284 EMERGENCY DEPT VISIT MOD MDM: CPT | Mod: Z6 | Performed by: EMERGENCY MEDICINE

## 2019-05-25 PROCEDURE — 81001 URINALYSIS AUTO W/SCOPE: CPT | Performed by: EMERGENCY MEDICINE

## 2019-05-25 PROCEDURE — 96361 HYDRATE IV INFUSION ADD-ON: CPT | Performed by: EMERGENCY MEDICINE

## 2019-05-25 PROCEDURE — 96360 HYDRATION IV INFUSION INIT: CPT | Performed by: EMERGENCY MEDICINE

## 2019-05-25 PROCEDURE — 74176 CT ABD & PELVIS W/O CONTRAST: CPT

## 2019-05-25 PROCEDURE — 87086 URINE CULTURE/COLONY COUNT: CPT | Performed by: EMERGENCY MEDICINE

## 2019-05-25 PROCEDURE — 80048 BASIC METABOLIC PNL TOTAL CA: CPT | Performed by: EMERGENCY MEDICINE

## 2019-05-25 PROCEDURE — 99284 EMERGENCY DEPT VISIT MOD MDM: CPT | Mod: 25 | Performed by: EMERGENCY MEDICINE

## 2019-05-25 RX ORDER — MULTIPLE VITAMINS W/ MINERALS TAB 9MG-400MCG
1 TAB ORAL DAILY
COMMUNITY

## 2019-05-25 RX ADMIN — SODIUM CHLORIDE 1000 ML: 900 INJECTION, SOLUTION INTRAVENOUS at 16:22

## 2019-05-25 ASSESSMENT — MIFFLIN-ST. JEOR: SCORE: 1352.15

## 2019-05-25 NOTE — ED PROVIDER NOTES
[place] EMERGENCY DEPARTMENT ([facility])  May 25, 2019    History     Chief Complaint   Patient presents with     Abdominal Pain     Flank Pain     The history is provided by the patient and medical records.     Mat Lei is a 43 year old female with a history significant for  section who presents to the Emergency Department today with a chief complaint of flank pain and difficulty with urination. Patient states she woke up at 5 am today to urinate, and still felt urinary urgency after voiding.  At noon today, she experienced the same urinary urgency.  She denies any overt hematuria or dysuria with urination.  Additionally, patient also endorses numb hands, abdominal and flank pain, light-headedness and chills but the symptoms have since resolved. Mat denies leg swelling, fever, kidney stones, or burning sensation when urinating.  She denies any nausea, vomiting, fevers or chills.    I have reviewed the Medications, Allergies, Past Medical and Surgical History, and Social History in the [employer] system.    Past Medical History:   Diagnosis Date     Contusion of coccyx     with skiing       Past Surgical History:   Procedure Laterality Date     C ORAL SURGERY PROCEDURE      wisdom teeth     C/SECTION, LOW TRANSVERSE      , Low Transverse     MOHS MICROGRAPHIC PROCEDURE      BCC- forehead       Family History   Problem Relation Age of Onset     Transient ischemic attack Father      Breast Cancer Maternal Grandfather         knows had breast cancer not sure if thats where it started       Social History     Tobacco Use     Smoking status: Never Smoker     Smokeless tobacco: Never Used   Substance Use Topics     Alcohol use: No       Current Facility-Administered Medications   Medication     0.9% sodium chloride BOLUS     Current Outpatient Medications   Medication     Cyanocobalamin (VITAMIN B12 PO)     IRON PO     multivitamin w/minerals (MULTI-VITAMIN) tablet      No Known  "Allergies      Review of Systems   ROS: 14 point ROS neg other than the symptoms noted above in the HPI.    Physical Exam   BP: 122/85  Pulse: 100  Temp: 97.8  F (36.6  C)  Resp: 16  Height: 157.5 cm (5' 2\")  Weight: 74.4 kg (164 lb)  SpO2: 97 %        Physical Exam   GEN: Well appearing, non toxic, cooperative.   HEENT: The head is normocephalic and atraumatic. Pupils are equal round and reactive to light. Extraocular motions are intact. There is no facial swelling. The neck is nontender and supple.   CV: Regular rate and rhythm without murmurs rubs or gallops. 2+ radial pulses bilaterally.  PULM: Clear to auscultation bilaterally.  ABD: Soft, nontender, nondistended.   EXT: Full range of motion.  No edema.  NEURO: Cranial nerves II through XII are intact and symmetric. Bilateral upper and lower extremities grossly show full range of motion without any focal deficits.   SKIN: No rashes, ecchymosis, or lacerations  PSYCH: Calm and cooperative, interactive.         ED Course   3:40 PM  The patient was seen and examined by Filiberto tSringer MD, in Room ED07.        Procedures             Critical Care time:  none     Results for orders placed or performed during the hospital encounter of 05/25/19   Abd/pelvis CT no contrast - Stone Protocol    Impression    Impression:    1. No acute renal abnormalities, no nephrolithiasis or obstructing  calculi.  2. Borderline thickened endometrial canal. Consider pelvic ultrasound  further characterize.   CBC with platelets differential   Result Value Ref Range    WBC 11.8 (H) 4.0 - 11.0 10e9/L    RBC Count 4.05 3.8 - 5.2 10e12/L    Hemoglobin 11.9 11.7 - 15.7 g/dL    Hematocrit 38.4 35.0 - 47.0 %    MCV 95 78 - 100 fl    MCH 29.4 26.5 - 33.0 pg    MCHC 31.0 (L) 31.5 - 36.5 g/dL    RDW 12.9 10.0 - 15.0 %    Platelet Count 273 150 - 450 10e9/L    Diff Method Automated Method     % Neutrophils 81.8 %    % Lymphocytes 14.4 %    % Monocytes 2.9 %    % Eosinophils 0.3 %    % Basophils 0.2 % "    % Immature Granulocytes 0.4 %    Nucleated RBCs 0 0 /100    Absolute Neutrophil 9.6 (H) 1.6 - 8.3 10e9/L    Absolute Lymphocytes 1.7 0.8 - 5.3 10e9/L    Absolute Monocytes 0.3 0.0 - 1.3 10e9/L    Absolute Eosinophils 0.0 0.0 - 0.7 10e9/L    Absolute Basophils 0.0 0.0 - 0.2 10e9/L    Abs Immature Granulocytes 0.1 0 - 0.4 10e9/L    Absolute Nucleated RBC 0.0    Basic metabolic panel   Result Value Ref Range    Sodium 137 133 - 144 mmol/L    Potassium 3.4 3.4 - 5.3 mmol/L    Chloride 102 94 - 109 mmol/L    Carbon Dioxide 27 20 - 32 mmol/L    Anion Gap 8 3 - 14 mmol/L    Glucose 145 (H) 70 - 99 mg/dL    Urea Nitrogen 12 7 - 30 mg/dL    Creatinine 0.70 0.52 - 1.04 mg/dL    GFR Estimate >90 >60 mL/min/[1.73_m2]    GFR Estimate If Black >90 >60 mL/min/[1.73_m2]    Calcium 8.5 8.5 - 10.1 mg/dL   Routine UA with microscopic   Result Value Ref Range    Color Urine Yellow     Appearance Urine Slightly Cloudy     Glucose Urine 70 (A) NEG^Negative mg/dL    Bilirubin Urine Negative NEG^Negative    Ketones Urine 5 (A) NEG^Negative mg/dL    Specific Gravity Urine 1.022 1.003 - 1.035    Blood Urine Moderate (A) NEG^Negative    pH Urine 7.0 5.0 - 7.0 pH    Protein Albumin Urine 10 (A) NEG^Negative mg/dL    Urobilinogen mg/dL Normal 0.0 - 2.0 mg/dL    Nitrite Urine Negative NEG^Negative    Leukocyte Esterase Urine Negative NEG^Negative    Source Midstream Urine     WBC Urine 2 0 - 5 /HPF    RBC Urine >182 (H) 0 - 2 /HPF    Squamous Epithelial /HPF Urine 7 (H) 0 - 1 /HPF    Mucous Urine Present (A) NEG^Negative /LPF   Urine Culture Aerobic Bacterial   Result Value Ref Range    Specimen Description Midstream Urine     Special Requests Specimen received in preservative     Culture Micro PENDING                Labs Ordered and Resulted from Time of ED Arrival Up to the Time of Departure from the ED   CBC WITH PLATELETS DIFFERENTIAL - Abnormal; Notable for the following components:       Result Value    WBC 11.8 (*)     MCHC 31.0 (*)      Absolute Neutrophil 9.6 (*)     All other components within normal limits   BASIC METABOLIC PANEL - Abnormal; Notable for the following components:    Glucose 145 (*)     All other components within normal limits   ROUTINE UA WITH MICROSCOPIC - Abnormal; Notable for the following components:    Glucose Urine 70 (*)     Ketones Urine 5 (*)     Blood Urine Moderate (*)     Protein Albumin Urine 10 (*)     RBC Urine >182 (*)     Squamous Epithelial /HPF Urine 7 (*)     Mucous Urine Present (*)     All other components within normal limits   URINE CULTURE AEROBIC BACTERIAL            Assessments & Plan (with Medical Decision Making)   Patient is a 43-year-old female who presents with left-sided flank pain and some issues with urination that all started today.  Initial vitals were within normal limits, patient afebrile.  Exam as above, most notable for a benign abdomen no overt flank tenderness.  In route, EMS gave patient Toradol which completely resolved her symptoms.  Labs were obtained and all within normal limits.  Urinalysis did however show both blood and RBCs.  CT stone protocol was obtained and negative for any overt nephrolithiasis or kidney pathology.  Her urinalysis was sent for culture.  One explanation is that patient passed a stone earlier today and still has some hematuria secondary to this.  Given her benign exam, improved symptoms, fairly negative work-up, patient was discharged home in stable and improved condition.  She was advised to continue p.o. hydration and to follow-up with her primary doctor early next week.  She is also given very strict return precautions for any worsening symptoms to include fevers, chills, nausea or vomiting.  She was advised to take as needed ibuprofen for any minor pain symptoms, but again told if her pain is worse to return to the emergency department.    I have reviewed the nursing notes.    I have reviewed the findings, diagnosis, plan and need for follow up with  the patient.    After patient was discharged, staff radiologist contacted me and informed me that she does not fact have a 3 mm nonobstructing stone on the left UVJ.  I contacted the patient and informed her of this finding and again advised her to continue taking ibuprofen.  She is also advised to return if she has any worsening symptoms.       Medication List      There are no discharge medications for this visit.         Final diagnoses:   Flank pain     IPablo, am serving as a trained medical scribe to document services personally performed by Filiberto Stringer MD, based on the provider's statements to me.      IFiliberto MD, was physically present and have reviewed and verified the accuracy of this note documented by Pablo Van.       5/25/2019   [facility], [place], EMERGENCY DEPARTMENT     Filiberto Stringer MD  05/25/19 [id number]       Filiberto Stringer MD  05/25/19 [id number]

## 2019-05-25 NOTE — ED TRIAGE NOTES
"Triage Assessment & Note:    /85   Pulse 100   Temp 97.8  F (36.6  C) (Oral)   Resp 16   Ht 1.575 m (5' 2\")   Wt 74.4 kg (164 lb)   LMP 05/01/2019   SpO2 97%   BMI 30.00 kg/m        Patient presents with: Pt BIBA from home with flank pain/ abdominal pain. Pt reports an episode of n/v and some dizziness today as well.  No reports of fever, cough, SOB, or CP.    Home Treatments/Remedies: Home medications, EMS gave zofran and tordol    Febrile / Afebrile: afebrile    Duration of C/o: < 12 hours    Dayan Gu RN  May 25, 2019        "

## 2019-05-26 LAB
BACTERIA SPEC CULT: NORMAL
Lab: NORMAL
SPECIMEN SOURCE: NORMAL

## 2019-06-03 ENCOUNTER — OFFICE VISIT (OUTPATIENT)
Dept: FAMILY MEDICINE | Facility: CLINIC | Age: 44
End: 2019-06-03
Payer: COMMERCIAL

## 2019-06-03 VITALS
TEMPERATURE: 98.4 F | BODY MASS INDEX: 30.96 KG/M2 | WEIGHT: 164 LBS | DIASTOLIC BLOOD PRESSURE: 80 MMHG | OXYGEN SATURATION: 100 % | HEIGHT: 61 IN | SYSTOLIC BLOOD PRESSURE: 120 MMHG | HEART RATE: 80 BPM | RESPIRATION RATE: 16 BRPM

## 2019-06-03 DIAGNOSIS — N20.0 NEPHROLITHIASIS: Primary | ICD-10-CM

## 2019-06-03 DIAGNOSIS — R93.89 ABNORMAL PELVIC ULTRASOUND: ICD-10-CM

## 2019-06-03 LAB
ALBUMIN UR-MCNC: NEGATIVE MG/DL
APPEARANCE UR: CLEAR
BILIRUB UR QL STRIP: NEGATIVE
COLOR UR AUTO: YELLOW
GLUCOSE UR STRIP-MCNC: NEGATIVE MG/DL
HGB UR QL STRIP: NEGATIVE
KETONES UR STRIP-MCNC: NEGATIVE MG/DL
LEUKOCYTE ESTERASE UR QL STRIP: NEGATIVE
NITRATE UR QL: NEGATIVE
PH UR STRIP: 5.5 PH (ref 5–7)
SOURCE: NORMAL
SP GR UR STRIP: <=1.005 (ref 1–1.03)
UROBILINOGEN UR STRIP-ACNC: 0.2 EU/DL (ref 0.2–1)

## 2019-06-03 PROCEDURE — 99214 OFFICE O/P EST MOD 30 MIN: CPT | Performed by: FAMILY MEDICINE

## 2019-06-03 PROCEDURE — 81003 URINALYSIS AUTO W/O SCOPE: CPT | Performed by: FAMILY MEDICINE

## 2019-06-03 ASSESSMENT — PAIN SCALES - GENERAL: PAINLEVEL: NO PAIN (0)

## 2019-06-03 ASSESSMENT — MIFFLIN-ST. JEOR: SCORE: 1340.24

## 2019-06-03 NOTE — PATIENT INSTRUCTIONS
Aim for 60-80 ounces of water per day.    While you are taking an iron supplement, take a daily stool softener.     Take 1-2 tablets of Senna for a couple days to start clearing your bowels.     Patient Education

## 2019-06-03 NOTE — PROGRESS NOTES
Subjective     Mat Lei is a 43 year old female who presents to clinic today for the following health issues:    HPI   ED/UC Followup:    Facility:  [facility]  Date of visit: 19  Reason for visit: kidney stone  Current Status: improved    Mat Lei is a 43 year old female with a history significant for  section who presents to the Emergency Department today with a chief complaint of flank pain and difficulty with urination. Patient states she woke up at 5 am today to urinate, and still felt urinary urgency after voiding.  At noon today, she experienced the same urinary urgency.  She denies any overt hematuria or dysuria with urination.  Additionally, patient also endorses numb hands, abdominal and flank pain, light-headedness and chills but the symptoms have since resolved. Mat denies leg swelling, fever, kidney stones, or burning sensation when urinating.  She denies any nausea, vomiting, fevers or chills.     I have reviewed the Medications, Allergies, Past Medical and Surgical History, and Social History in the [employer] system.     Past Medical History        Past Medical History:   Diagnosis Date     Contusion of coccyx 2018     with skiing            Past Surgical History         Past Surgical History:   Procedure Laterality Date     C ORAL SURGERY PROCEDURE         wisdom teeth     C/SECTION, LOW TRANSVERSE         , Low Transverse     MOHS MICROGRAPHIC PROCEDURE         BCC- forehead            Family History         Family History   Problem Relation Age of Onset     Transient ischemic attack Father       Breast Cancer Maternal Grandfather           knows had breast cancer not sure if thats where it started            Social History           Tobacco Use     Smoking status: Never Smoker     Smokeless tobacco: Never Used   Substance Use Topics     Alcohol use: No             Current Facility-Administered Medications   Medication     0.9% sodium chloride  "BOLUS          Current Outpatient Medications   Medication     Cyanocobalamin (VITAMIN B12 PO)     IRON PO     multivitamin w/minerals (MULTI-VITAMIN) tablet       No Known Allergies        Review of Systems   ROS: 14 point ROS neg other than the symptoms noted above in the HPI.     Physical Exam   BP: 122/85  Pulse: 100  Temp: 97.8  F (36.6  C)  Resp: 16  Height: 157.5 cm (5' 2\")  Weight: 74.4 kg (164 lb)  SpO2: 97 %           Physical Exam   GEN: Well appearing, non toxic, cooperative.   HEENT: The head is normocephalic and atraumatic. Pupils are equal round and reactive to light. Extraocular motions are intact. There is no facial swelling. The neck is nontender and supple.   CV: Regular rate and rhythm without murmurs rubs or gallops. 2+ radial pulses bilaterally.  PULM: Clear to auscultation bilaterally.  ABD: Soft, nontender, nondistended.   EXT: Full range of motion.  No edema.  NEURO: Cranial nerves II through XII are intact and symmetric. Bilateral upper and lower extremities grossly show full range of motion without any focal deficits.   SKIN: No rashes, ecchymosis, or lacerations  PSYCH: Calm and cooperative, interactive.            ED Course   3:40 PM  The patient was seen and examined by Filiberto Stringer MD, in Room ED07.      Procedures              Critical Care time:  none           Results for orders placed or performed during the hospital encounter of 05/25/19   Abd/pelvis CT no contrast - Stone Protocol     Impression     Impression:     1. No acute renal abnormalities, no nephrolithiasis or obstructing  calculi.  2. Borderline thickened endometrial canal. Consider pelvic ultrasound  further characterize.   CBC with platelets differential   Result Value Ref Range     WBC 11.8 (H) 4.0 - 11.0 10e9/L     RBC Count 4.05 3.8 - 5.2 10e12/L     Hemoglobin 11.9 11.7 - 15.7 g/dL     Hematocrit 38.4 35.0 - 47.0 %     MCV 95 78 - 100 fl     MCH 29.4 26.5 - 33.0 pg     MCHC 31.0 (L) 31.5 - 36.5 g/dL     RDW 12.9 " 10.0 - 15.0 %     Platelet Count 273 150 - 450 10e9/L     Diff Method Automated Method       % Neutrophils 81.8 %     % Lymphocytes 14.4 %     % Monocytes 2.9 %     % Eosinophils 0.3 %     % Basophils 0.2 %     % Immature Granulocytes 0.4 %     Nucleated RBCs 0 0 /100     Absolute Neutrophil 9.6 (H) 1.6 - 8.3 10e9/L     Absolute Lymphocytes 1.7 0.8 - 5.3 10e9/L     Absolute Monocytes 0.3 0.0 - 1.3 10e9/L     Absolute Eosinophils 0.0 0.0 - 0.7 10e9/L     Absolute Basophils 0.0 0.0 - 0.2 10e9/L     Abs Immature Granulocytes 0.1 0 - 0.4 10e9/L     Absolute Nucleated RBC 0.0     Basic metabolic panel   Result Value Ref Range     Sodium 137 133 - 144 mmol/L     Potassium 3.4 3.4 - 5.3 mmol/L     Chloride 102 94 - 109 mmol/L     Carbon Dioxide 27 20 - 32 mmol/L     Anion Gap 8 3 - 14 mmol/L     Glucose 145 (H) 70 - 99 mg/dL     Urea Nitrogen 12 7 - 30 mg/dL     Creatinine 0.70 0.52 - 1.04 mg/dL     GFR Estimate >90 >60 mL/min/[1.73_m2]     GFR Estimate If Black >90 >60 mL/min/[1.73_m2]     Calcium 8.5 8.5 - 10.1 mg/dL   Routine UA with microscopic   Result Value Ref Range     Color Urine Yellow       Appearance Urine Slightly Cloudy       Glucose Urine 70 (A) NEG^Negative mg/dL     Bilirubin Urine Negative NEG^Negative     Ketones Urine 5 (A) NEG^Negative mg/dL     Specific Gravity Urine 1.022 1.003 - 1.035     Blood Urine Moderate (A) NEG^Negative     pH Urine 7.0 5.0 - 7.0 pH     Protein Albumin Urine 10 (A) NEG^Negative mg/dL     Urobilinogen mg/dL Normal 0.0 - 2.0 mg/dL     Nitrite Urine Negative NEG^Negative     Leukocyte Esterase Urine Negative NEG^Negative     Source Midstream Urine       WBC Urine 2 0 - 5 /HPF     RBC Urine >182 (H) 0 - 2 /HPF     Squamous Epithelial /HPF Urine 7 (H) 0 - 1 /HPF     Mucous Urine Present (A) NEG^Negative /LPF   Urine Culture Aerobic Bacterial   Result Value Ref Range     Specimen Description Midstream Urine       Special Requests Specimen received in preservative       Culture Micro  PENDING                 Labs Ordered and Resulted from Time of ED Arrival Up to the Time of Departure from the ED   CBC WITH PLATELETS DIFFERENTIAL - Abnormal; Notable for the following components:       Result Value      WBC 11.8 (*)       MCHC 31.0 (*)       Absolute Neutrophil 9.6 (*)       All other components within normal limits   BASIC METABOLIC PANEL - Abnormal; Notable for the following components:     Glucose 145 (*)       All other components within normal limits   ROUTINE UA WITH MICROSCOPIC - Abnormal; Notable for the following components:     Glucose Urine 70 (*)       Ketones Urine 5 (*)       Blood Urine Moderate (*)       Protein Albumin Urine 10 (*)       RBC Urine >182 (*)       Squamous Epithelial /HPF Urine 7 (*)       Mucous Urine Present (*)       All other components within normal limits   URINE CULTURE AEROBIC BACTERIAL            Assessments & Plan (with Medical Decision Making)   Patient is a 43-year-old female who presents with left-sided flank pain and some issues with urination that all started today.  Initial vitals were within normal limits, patient afebrile.  Exam as above, most notable for a benign abdomen no overt flank tenderness.  In route, EMS gave patient Toradol which completely resolved her symptoms.  Labs were obtained and all within normal limits.  Urinalysis did however show both blood and RBCs.  CT stone protocol was obtained and negative for any overt nephrolithiasis or kidney pathology.  Her urinalysis was sent for culture.  One explanation is that patient passed a stone earlier today and still has some hematuria secondary to this.  Given her benign exam, improved symptoms, fairly negative work-up, patient was discharged home in stable and improved condition.  She was advised to continue p.o. hydration and to follow-up with her primary doctor early next week.  She is also given very strict return precautions for any worsening symptoms to include fevers, chills, nausea  or vomiting.  She was advised to take as needed ibuprofen for any minor pain symptoms, but again told if her pain is worse to return to the emergency department.     Nine days ago patient awoke with urinary urgency. After urinating, she still felt the need to urinate. A few hours later she started to have severe left flank pain and felt nauseous and lightheaded. She was in intense pain and her hands started going numb, so she called EMS and was taken to the ED. At first they did not see the stone and discharged patient, but she got a call later that another radiologist read the scan and saw a stone on CT.     She does think she passed the stone but was not straining her urine to look for it. This was her first episode of nephrolithiasis. She does not think she was dehydrated prior to this happening, but admits she may not be drinking enough. She has been pushing fluids since this happened. She wonders if her iron supplement could contribute to stone formation. She is eating a mainly plant based, whole grain diet and would like to avoid Miralax because she doesn't like it.    Currently, she denies flank pain, fever, chills, nausea, vomiting, abdominal pain,  symptoms.       Patient Active Problem List   Diagnosis     CARDIOVASCULAR SCREENING; LDL GOAL LESS THAN 160     BMI 30.0-30.9,adult     BCC (basal cell carcinoma), face     Elevated fasting glucose     Iron deficiency anemia due to chronic blood loss     Vegan diet     Past Surgical History:   Procedure Laterality Date     C ORAL SURGERY PROCEDURE      wisdom teeth     C/SECTION, LOW TRANSVERSE      , Low Transverse     MOHS MICROGRAPHIC PROCEDURE      BCC- forehead       Social History     Tobacco Use     Smoking status: Never Smoker     Smokeless tobacco: Never Used   Substance Use Topics     Alcohol use: No     Family History   Problem Relation Age of Onset     Transient ischemic attack Father      Breast Cancer Maternal Grandfather         knows  "had breast cancer not sure if thats where it started             Reviewed and updated as needed this visit by Provider         Review of Systems   ROS COMP: Constitutional, HEENT, cardiovascular, pulmonary, gi and gu systems are negative, except as otherwise noted.    This document serves as a record of the services and decisions personally performed by JENI SMITH. It was created on his/her behalf by Brittnee Ernandez, a trained medical scribe. The creation of this document is based on the provider's statements to the medical scribe. Brittnee Ernandez, Katie 3, 2019 11:44 AM      Objective    /80 (BP Location: Right arm, Patient Position: Chair, Cuff Size: Adult Regular)   Pulse 80   Temp 98.4  F (36.9  C) (Oral)   Resp 16   Ht 1.556 m (5' 1.25\")   Wt 74.4 kg (164 lb)   LMP 05/07/2019 (Approximate)   SpO2 100%   Breastfeeding? No   BMI 30.74 kg/m    Body mass index is 30.74 kg/m .  Physical Exam   GENERAL: healthy, alert and no distress  NECK: no adenopathy, no asymmetry, masses, or scars and thyroid normal to palpation  RESP: lungs clear to auscultation - no rales, rhonchi or wheezes  CV: regular rate and rhythm, normal S1 S2, no S3 or S4, no murmur, click or rub, no peripheral edema and peripheral pulses strong  ABDOMEN: soft, nontender, no hepatosplenomegaly, no masses and bowel sounds normal  MS: no gross musculoskeletal defects noted, no edema  BACK: no CVA tenderness, no paralumbar tenderness  PSYCH: mentation appears normal, affect normal/bright  Examination: CT ABDOMEN PELVIS W/O CONTRAST, 5/25/2019 4:57 PM     Comparison: 5/29/2018.     History: Flank pain, stone disease suspected.     Technique: Routine images from the level of the diaphragm through the  pelvis were obtained without contrast.     Total DLP: 284 mGy*cm.     Findings:  Lower chest:   Minimal bibasilar, dependent, subpleural reticular/ground glass  attenuation, consistent with atelectasis. No focal consolidation. " No  pneumothorax or pleural effusion. No suspicious nodules or masses.      The heart size is normal. No pericardial effusion.     Abdomen:  The liver, spleen, gallbladder, pancreas, kidneys, and adrenal glands  appear within normal limits. 3 mm left UVJ stone without significant  left hydroureter hydronephrosis.     There is no abnormally dilated or thickened loops of bowel. Appendix  is visualized and is within normal limits. Large volume of stool in  the colon. Diverticulosis without diverticulitis. No free air or free  fluid within the abdomen or pelvis. No inflammatory stranding is seen  in the mesenteric fat. No abnormally enlarged lymph nodes in the  abdomen or pelvis. Blood products in the endometrial canal with  endometrial canal measuring up to 1.7 cm.     The urinary bladder is distended but otherwise appears within normal  limits.      Aorta is nondilated.      Bones and soft tissues:   No suspicious lytic or blastic osseous lesions. A few hypoattenuating  foci in the L1, L2 vertebral bodies, likely hemangiomas.                                                                      Impression:  1. 3 mm ureterovesical stone on the left side. No left-sided  hydroureter or hydronephrosis. No other renal stones demonstrated.     I have personally reviewed the examination and initial interpretation  and I agree with the findings.     KELSIE AMADO MD        Assessment & Plan     1. Nephrolithiasis  Patient counseled on pushing fluids to prevent future stones   - UA reflex to Microscopic and Culture  - senna or miralax reviewed for large amt of stool seen on imaging. Constipation tx and prevention reviewed with iron.     2. Borderline thickened endometrial canal- reviewed change in impression from prelim read till final CT report of endmetrial lining after visit. Reviewed patient's past hx and she has a abnormal pelvic us noted in 2018 and f/u us was recommended if bleeding concerns continued but never  "completed. Will contact patient to complete pelvic us for more info.      BMI:   Estimated body mass index is 30.74 kg/m  as calculated from the following:    Height as of this encounter: 1.556 m (5' 1.25\").    Weight as of this encounter: 74.4 kg (164 lb).   Weight management plan: not addressed at this visit        Patient Instructions   Aim for 60-80 ounces of water per day.    While you are taking an iron supplement, take a daily stool softener.     Take 1-2 tablets of Senna for a couple days to start clearing your bowels.     Patient Education           Length of visit was 14 minutes with more than 50 percent of that time used for discussing medical concerns and education    No follow-ups on file.    The information in this document, created by the medical scribe for me, accurately reflects the services I personally performed and the decisions made by me. I have reviewed and approved this document for accuracy.   Lexy Deleon MD  [facility]      "

## 2019-10-31 ENCOUNTER — ALLIED HEALTH/NURSE VISIT (OUTPATIENT)
Dept: NURSING | Facility: CLINIC | Age: 44
End: 2019-10-31
Payer: COMMERCIAL

## 2019-10-31 DIAGNOSIS — Z23 NEED FOR PROPHYLACTIC VACCINATION AND INOCULATION AGAINST INFLUENZA: Primary | ICD-10-CM

## 2019-10-31 PROCEDURE — 90686 IIV4 VACC NO PRSV 0.5 ML IM: CPT

## 2019-10-31 PROCEDURE — 90471 IMMUNIZATION ADMIN: CPT

## 2019-10-31 PROCEDURE — 99207 ZZC NO CHARGE NURSE ONLY: CPT

## 2020-12-06 ENCOUNTER — HEALTH MAINTENANCE LETTER (OUTPATIENT)
Age: 45
End: 2020-12-06

## 2021-02-20 ENCOUNTER — HEALTH MAINTENANCE LETTER (OUTPATIENT)
Age: 46
End: 2021-02-20

## 2021-04-23 ENCOUNTER — MYC MEDICAL ADVICE (OUTPATIENT)
Dept: FAMILY MEDICINE | Facility: CLINIC | Age: 46
End: 2021-04-23

## 2021-04-23 DIAGNOSIS — Z78.9 VEGAN DIET: ICD-10-CM

## 2021-04-23 DIAGNOSIS — D50.0 IRON DEFICIENCY ANEMIA DUE TO CHRONIC BLOOD LOSS: Primary | ICD-10-CM

## 2021-04-23 DIAGNOSIS — Z13.220 SCREENING FOR CHOLESTEROL LEVEL: ICD-10-CM

## 2021-04-23 DIAGNOSIS — R73.01 ELEVATED FASTING GLUCOSE: ICD-10-CM

## 2021-04-23 DIAGNOSIS — Z87.442 HISTORY OF NEPHROLITHIASIS: ICD-10-CM

## 2021-04-23 NOTE — TELEPHONE ENCOUNTER
No future lab orders seen.  Pt wondering about any fasting labs at visit  Scheduled with provider on 4/26/21

## 2021-05-26 NOTE — PROGRESS NOTES
SUBJECTIVE:   CC: Mat Lei is an 45 year old woman who presents for preventive health visit.     Patient has been advised of split billing requirements and indicates understanding: Yes  Healthy Habits:     Getting at least 3 servings of Calcium per day:  Yes    Bi-annual eye exam:  NO    Dental care twice a year:  NO    Sleep apnea or symptoms of sleep apnea:  None    Diet:  Vegetarian/vegan    Frequency of exercise:  2-3 days/week    Duration of exercise:  30-45 minutes    Taking medications regularly:  Not Applicable    Medication side effects:  Not applicable    PHQ-2 Total Score: 0    Additional concerns today:  Yes        Patient would like to discuss quyen-menopause and periods.    Today's PHQ-2 Score:   PHQ-2 ( 1999 [employer]) 5/27/2021   Q1: Little interest or pleasure in doing things 0   Q2: Feeling down, depressed or hopeless 0   PHQ-2 Score 0   Q1: Little interest or pleasure in doing things Not at all   Q2: Feeling down, depressed or hopeless Not at all   PHQ-2 Score 0       Abuse: Current or Past (Physical, Sexual or Emotional) - No  Do you feel safe in your environment? Yes    Have you ever done Advance Care Planning? (For example, a Health Directive, POLST, or a discussion with a medical provider or your loved ones about your wishes): No, advance care planning information given to patient to review.  Advanced care planning was discussed at today's visit.    Social History     Tobacco Use     Smoking status: Never Smoker     Smokeless tobacco: Never Used   Substance Use Topics     Alcohol use: No         Alcohol Use 5/27/2021   Prescreen: >3 drinks/day or >7 drinks/week? Not Applicable   Prescreen: >3 drinks/day or >7 drinks/week? -       Reviewed orders with patient.  Reviewed health maintenance and updated orders accordingly - Yes      Breast Cancer Screening:  Any new diagnosis of family breast, ovarian, or bowel cancer? Yes     Some ? If gma had breast cancer.     FSH-7:   Breast CA Risk  Assessment (FHS-7) 5/27/2021   Did any of your first-degree relatives have breast or ovarian cancer? No   Did any of your relatives have bilateral breast cancer? No   Did any man in your family have breast cancer? Yes   Did any woman in your family have breast and ovarian cancer? No   Did any woman in your family have breast cancer before age 50 y? No   Do you have 2 or more relatives with breast and/or ovarian cancer? No   Do you have 2 or more relatives with breast and/or bowel cancer? No       Mammogram Screening: Recommended annual mammography  Pertinent mammograms are reviewed under the imaging tab.    History of abnormal Pap smear: NO - age 30-65 PAP every 5 years with negative HPV co-testing recommended  PAP / HPV Latest Ref Rng & Units 10/3/2016 9/13/2012   PAP - NIL NIL   HPV 16 DNA NEG Negative -   HPV 18 DNA NEG Negative -   OTHER HR HPV NEG Negative -     Reviewed and updated as needed this visit by clinical staff  Tobacco  Allergies  Meds   Med Hx  Surg Hx  Fam Hx  Soc Hx        Reviewed and updated as needed this visit by Provider                    Review of Systems   Breasts:  Negative for tenderness, breast mass and discharge.   Genitourinary: Negative for pelvic pain, vaginal bleeding and vaginal discharge.   Psychiatric/Behavioral: The patient is nervous/anxious.        Take iron when menstruating each month.   If constipating will not take for a bit.   Vegetarian 60% of the time   Wt is up which is frustrating for her as trying to eat healthy and exercise.   Started working with therapist to help with political issues with parents and work on consistency.     Cycles will be very heavy for first few days.   Back pain by shoulder blade the day prior to menses or on first day of menses.   Use menstrual cup and will fill the cup 3-4 x's during the day on her heavier days For 1-2 days of the cycle.   Cycles still regular, bleed for 4 days total but with some started bleeding at the end of the  "cycle  Condoms for contraception.  planning vasectomy        OBJECTIVE:   /75   Pulse 85   Temp 98  F (36.7  C) (Oral)   Resp 16   Ht 1.562 m (5' 1.5\")   Wt 77.7 kg (171 lb 4.8 oz)   LMP 05/08/2021   BMI 31.84 kg/m    Physical Exam  GENERAL: healthy, alert and no distress  EYES: Eyes grossly normal to inspection, PERRL and conjunctivae and sclerae normal  HENT: ear canals and TM's normal, nose and mouth without ulcers or lesions  NECK: no adenopathy, no asymmetry, masses, or scars and thyroid normal to palpation  RESP: lungs clear to auscultation - no rales, rhonchi or wheezes  BREAST: normal without masses, tenderness or nipple discharge and no palpable axillary masses or adenopathy  CV: regular rate and rhythm, normal S1 S2, no S3 or S4, no murmur, click or rub, no peripheral edema and peripheral pulses strong  ABDOMEN: soft, nontender, no hepatosplenomegaly, no masses and bowel sounds normal  MS: no gross musculoskeletal defects noted, no edema  SKIN: no suspicious lesions or rashes  NEURO: Normal strength and tone, mentation intact and speech normal  PSYCH: mentation appears normal, affect normal/bright      ASSESSMENT/PLAN:   1. Routine general medical examination at a health care facility    2. Encounter for screening mammogram for breast cancer  Patient has some question whether her grandfather had a primary breast cancer.  We reviewed this is fairly rare and if this was the case she could be at increased risk for breast cancer.  Offered referral to our cancer risk assessment which she deferred today but she will look further into her grandfathers diagnosis.  Encouraged at minimum yearly mammograms for now.  - *MA Screening Digital Bilateral; Future    3. Elevated fasting glucose  - Hemoglobin A1c  - Basic metabolic panel  (Ca, Cl, CO2, Creat, Gluc, K, Na, BUN)    4. History of nephrolithiasis  No recurrence of symptoms  - Basic metabolic panel  (Ca, Cl, CO2, Creat, Gluc, K, Na, BUN)    5. " "Screening for cholesterol level  - Lipid panel reflex to direct LDL Fasting    6. Vegan diet  - Vitamin B12  - Ferritin  - CBC with platelets differential    7. Iron deficiency anemia due to chronic blood loss  History of.  Taking iron intermittently.  Suspect due to her menorrhagia and low dietary intake  - Ferritin  - CBC with platelets differential    8. Weight gain  Reviewed healthy lifestyle measures for weight management  - TSH with free T4 reflex    9. Abnormal ultrasound of uterus  Reviewed past history of abnormal uterine lining seen on previous scans.  Given her heavy bleeding recommend pelvic ultrasound to evaluate.  Further follow-up with GYN may be   COUNSELING:  Reviewed preventive health counseling, as reflected in patient instructions       Regular exercise       Healthy diet/nutrition       Contraception       Osteoporosis prevention/bone health       Colon cancer screening       (Kait)menopause management    Estimated body mass index is 31.84 kg/m  as calculated from the following:    Height as of this encounter: 1.562 m (5' 1.5\").    Weight as of this encounter: 77.7 kg (171 lb 4.8 oz).    Weight management plan: Discussed healthy diet and exercise guidelines    She reports that she has never smoked. She has never used smokeless tobacco.      Counseling Resources:  ATP IV Guidelines  Pooled Cohorts Equation Calculator  Breast Cancer Risk Calculator  BRCA-Related Cancer Risk Assessment: FHS-7 Tool  FRAX Risk Assessment  ICSI Preventive Guidelines  Dietary Guidelines for Americans, 2010  USDA's MyPlate  ASA Prophylaxis  Lung CA Screening    Lexy Deleon MD  [facility]"

## 2021-05-26 NOTE — PATIENT INSTRUCTIONS
Please call  [facility] in [place] at [phone] to schedule mammogram and ultrasound.     Preventive Health Recommendations  Female Ages 40 to 49    Yearly exam:     See your health care provider every year in order to  1. Review health changes.   2. Discuss preventive care.    3. Review your medicines if your doctor prescribed any.      Get a Pap test every three years (unless you have an abnormal result and your provider advises testing more often).      If you get Pap tests with HPV test, you only need to test every 5 years, unless you have an abnormal result. You do not need a Pap test if your uterus was removed (hysterectomy) and you have not had cancer.      You should be tested each year for STDs (sexually transmitted diseases), if you're at risk.     Ask your doctor if you should have a mammogram.      Have a colonoscopy (test for colon cancer) if someone in your family has had colon cancer or polyps before age 50.       Have a cholesterol test every 5 years.       Have a diabetes test (fasting glucose) after age 45. If you are at risk for diabetes, you should have this test every 3 years.    Shots: Get a flu shot each year. Get a tetanus shot every 10 years.     Nutrition:     Eat at least 5 servings of fruits and vegetables each day.    Eat whole-grain bread, whole-wheat pasta and brown rice instead of white grains and rice.    Get adequate Calcium and Vitamin D.      Lifestyle    Exercise at least 150 minutes a week (an average of 30 minutes a day, 5 days a week). This will help you control your weight and prevent disease.    Limit alcohol to one drink per day.    No smoking.     Wear sunscreen to prevent skin cancer.    See your dentist every six months for an exam and cleaning.

## 2021-05-27 ENCOUNTER — OFFICE VISIT (OUTPATIENT)
Dept: FAMILY MEDICINE | Facility: CLINIC | Age: 46
End: 2021-05-27
Payer: COMMERCIAL

## 2021-05-27 VITALS
HEART RATE: 85 BPM | WEIGHT: 171.3 LBS | HEIGHT: 62 IN | DIASTOLIC BLOOD PRESSURE: 75 MMHG | RESPIRATION RATE: 16 BRPM | SYSTOLIC BLOOD PRESSURE: 114 MMHG | BODY MASS INDEX: 31.52 KG/M2 | TEMPERATURE: 98 F

## 2021-05-27 DIAGNOSIS — R63.5 WEIGHT GAIN: ICD-10-CM

## 2021-05-27 DIAGNOSIS — Z00.00 ROUTINE GENERAL MEDICAL EXAMINATION AT A HEALTH CARE FACILITY: Primary | ICD-10-CM

## 2021-05-27 DIAGNOSIS — Z12.31 ENCOUNTER FOR SCREENING MAMMOGRAM FOR BREAST CANCER: ICD-10-CM

## 2021-05-27 DIAGNOSIS — R93.5 ABNORMAL ULTRASOUND OF UTERUS: ICD-10-CM

## 2021-05-27 DIAGNOSIS — Z12.4 SCREENING FOR CERVICAL CANCER: ICD-10-CM

## 2021-05-27 DIAGNOSIS — D50.0 IRON DEFICIENCY ANEMIA DUE TO CHRONIC BLOOD LOSS: ICD-10-CM

## 2021-05-27 DIAGNOSIS — Z13.220 SCREENING FOR CHOLESTEROL LEVEL: ICD-10-CM

## 2021-05-27 DIAGNOSIS — Z78.9 VEGAN DIET: ICD-10-CM

## 2021-05-27 DIAGNOSIS — Z87.442 HISTORY OF NEPHROLITHIASIS: ICD-10-CM

## 2021-05-27 DIAGNOSIS — R73.01 ELEVATED FASTING GLUCOSE: ICD-10-CM

## 2021-05-27 LAB
ANION GAP SERPL CALCULATED.3IONS-SCNC: 1 MMOL/L (ref 3–14)
BASOPHILS # BLD AUTO: 0 10E9/L (ref 0–0.2)
BASOPHILS NFR BLD AUTO: 0.1 %
BUN SERPL-MCNC: 9 MG/DL (ref 7–30)
CALCIUM SERPL-MCNC: 8.8 MG/DL (ref 8.5–10.1)
CHLORIDE SERPL-SCNC: 108 MMOL/L (ref 94–109)
CHOLEST SERPL-MCNC: 241 MG/DL
CO2 SERPL-SCNC: 28 MMOL/L (ref 20–32)
CREAT SERPL-MCNC: 0.87 MG/DL (ref 0.52–1.04)
DIFFERENTIAL METHOD BLD: NORMAL
EOSINOPHIL # BLD AUTO: 0.1 10E9/L (ref 0–0.7)
EOSINOPHIL NFR BLD AUTO: 1.2 %
ERYTHROCYTE [DISTWIDTH] IN BLOOD BY AUTOMATED COUNT: 13.7 % (ref 10–15)
FERRITIN SERPL-MCNC: 10 NG/ML (ref 8–252)
GFR SERPL CREATININE-BSD FRML MDRD: 80 ML/MIN/{1.73_M2}
GLUCOSE SERPL-MCNC: 103 MG/DL (ref 70–99)
HBA1C MFR BLD: 5.6 % (ref 0–5.6)
HCT VFR BLD AUTO: 41.9 % (ref 35–47)
HDLC SERPL-MCNC: 54 MG/DL
HGB BLD-MCNC: 13.5 G/DL (ref 11.7–15.7)
LDLC SERPL CALC-MCNC: 143 MG/DL
LYMPHOCYTES # BLD AUTO: 2.2 10E9/L (ref 0.8–5.3)
LYMPHOCYTES NFR BLD AUTO: 29.8 %
MCH RBC QN AUTO: 30.5 PG (ref 26.5–33)
MCHC RBC AUTO-ENTMCNC: 32.2 G/DL (ref 31.5–36.5)
MCV RBC AUTO: 95 FL (ref 78–100)
MONOCYTES # BLD AUTO: 0.4 10E9/L (ref 0–1.3)
MONOCYTES NFR BLD AUTO: 5 %
NEUTROPHILS # BLD AUTO: 4.8 10E9/L (ref 1.6–8.3)
NEUTROPHILS NFR BLD AUTO: 63.9 %
NONHDLC SERPL-MCNC: 187 MG/DL
PLATELET # BLD AUTO: 303 10E9/L (ref 150–450)
POTASSIUM SERPL-SCNC: 4.3 MMOL/L (ref 3.4–5.3)
RBC # BLD AUTO: 4.43 10E12/L (ref 3.8–5.2)
SODIUM SERPL-SCNC: 137 MMOL/L (ref 133–144)
TRIGL SERPL-MCNC: 220 MG/DL
TSH SERPL DL<=0.005 MIU/L-ACNC: 1.21 MU/L (ref 0.4–4)
VIT B12 SERPL-MCNC: 323 PG/ML (ref 193–986)
WBC # BLD AUTO: 7.4 10E9/L (ref 4–11)

## 2021-05-27 PROCEDURE — 99396 PREV VISIT EST AGE 40-64: CPT | Performed by: FAMILY MEDICINE

## 2021-05-27 PROCEDURE — 80048 BASIC METABOLIC PNL TOTAL CA: CPT | Performed by: FAMILY MEDICINE

## 2021-05-27 PROCEDURE — 84443 ASSAY THYROID STIM HORMONE: CPT | Performed by: FAMILY MEDICINE

## 2021-05-27 PROCEDURE — 87624 HPV HI-RISK TYP POOLED RSLT: CPT | Performed by: FAMILY MEDICINE

## 2021-05-27 PROCEDURE — G0145 SCR C/V CYTO,THINLAYER,RESCR: HCPCS | Performed by: FAMILY MEDICINE

## 2021-05-27 PROCEDURE — 85025 COMPLETE CBC W/AUTO DIFF WBC: CPT | Performed by: FAMILY MEDICINE

## 2021-05-27 PROCEDURE — 99213 OFFICE O/P EST LOW 20 MIN: CPT | Mod: 25 | Performed by: FAMILY MEDICINE

## 2021-05-27 PROCEDURE — 36415 COLL VENOUS BLD VENIPUNCTURE: CPT | Performed by: FAMILY MEDICINE

## 2021-05-27 PROCEDURE — 83036 HEMOGLOBIN GLYCOSYLATED A1C: CPT | Performed by: FAMILY MEDICINE

## 2021-05-27 PROCEDURE — 80061 LIPID PANEL: CPT | Performed by: FAMILY MEDICINE

## 2021-05-27 PROCEDURE — 82728 ASSAY OF FERRITIN: CPT | Performed by: FAMILY MEDICINE

## 2021-05-27 PROCEDURE — 82607 VITAMIN B-12: CPT | Performed by: FAMILY MEDICINE

## 2021-05-27 ASSESSMENT — ENCOUNTER SYMPTOMS
BREAST MASS: 0
NERVOUS/ANXIOUS: 1

## 2021-05-27 ASSESSMENT — MIFFLIN-ST. JEOR: SCORE: 1367.32

## 2021-06-01 LAB
COPATH REPORT: NORMAL
PAP: NORMAL

## 2021-06-03 LAB
FINAL DIAGNOSIS: NORMAL
HPV HR 12 DNA CVX QL NAA+PROBE: NEGATIVE
HPV16 DNA SPEC QL NAA+PROBE: NEGATIVE
HPV18 DNA SPEC QL NAA+PROBE: NEGATIVE
SPECIMEN DESCRIPTION: NORMAL
SPECIMEN SOURCE CVX/VAG CYTO: NORMAL

## 2021-06-15 ENCOUNTER — ANCILLARY PROCEDURE (OUTPATIENT)
Dept: ULTRASOUND IMAGING | Facility: CLINIC | Age: 46
End: 2021-06-15
Attending: FAMILY MEDICINE
Payer: COMMERCIAL

## 2021-06-15 ENCOUNTER — ANCILLARY PROCEDURE (OUTPATIENT)
Dept: MAMMOGRAPHY | Facility: CLINIC | Age: 46
End: 2021-06-15
Attending: FAMILY MEDICINE
Payer: COMMERCIAL

## 2021-06-15 DIAGNOSIS — Z12.31 ENCOUNTER FOR SCREENING MAMMOGRAM FOR BREAST CANCER: ICD-10-CM

## 2021-06-15 DIAGNOSIS — R93.5 ABNORMAL ULTRASOUND OF UTERUS: ICD-10-CM

## 2021-06-15 LAB — RADIOLOGIST FLAGS: NORMAL

## 2021-06-15 PROCEDURE — 77063 BREAST TOMOSYNTHESIS BI: CPT | Mod: GC | Performed by: RADIOLOGY

## 2021-06-15 PROCEDURE — 76830 TRANSVAGINAL US NON-OB: CPT | Performed by: STUDENT IN AN ORGANIZED HEALTH CARE EDUCATION/TRAINING PROGRAM

## 2021-06-15 PROCEDURE — 76856 US EXAM PELVIC COMPLETE: CPT | Mod: 59 | Performed by: STUDENT IN AN ORGANIZED HEALTH CARE EDUCATION/TRAINING PROGRAM

## 2021-06-15 PROCEDURE — 77067 SCR MAMMO BI INCL CAD: CPT | Mod: GC | Performed by: RADIOLOGY

## 2021-06-16 DIAGNOSIS — R93.89 ABNORMAL PELVIC ULTRASOUND: Primary | ICD-10-CM

## 2021-06-16 DIAGNOSIS — N92.0 MENORRHAGIA WITH REGULAR CYCLE: ICD-10-CM

## 2021-09-25 ENCOUNTER — HEALTH MAINTENANCE LETTER (OUTPATIENT)
Age: 46
End: 2021-09-25

## 2021-12-17 ENCOUNTER — E-VISIT (OUTPATIENT)
Dept: FAMILY MEDICINE | Facility: CLINIC | Age: 46
End: 2021-12-17
Payer: COMMERCIAL

## 2021-12-17 DIAGNOSIS — M79.671 RIGHT FOOT PAIN: Primary | ICD-10-CM

## 2021-12-17 PROCEDURE — 99421 OL DIG E/M SVC 5-10 MIN: CPT | Performed by: FAMILY MEDICINE

## 2022-07-02 ENCOUNTER — HEALTH MAINTENANCE LETTER (OUTPATIENT)
Age: 47
End: 2022-07-02

## 2022-08-27 ENCOUNTER — HEALTH MAINTENANCE LETTER (OUTPATIENT)
Age: 47
End: 2022-08-27

## 2022-09-10 NOTE — ED AVS SNAPSHOT
[facility], [place], Emergency Department  [address] [place] [id number]  Phone:  [phone]                                    [email or web address]   MRN: [id number]    Department:  [facility], Emergency Department   Date of Visit:  5/25/2019           After Visit Summary Signature Page    I have received my discharge instructions, and my questions have been answered. I have discussed any challenges I see with this plan with the nurse or doctor.    ..........................................................................................................................................  Patient/Patient Representative Signature      ..........................................................................................................................................  Patient Representative Print Name and Relationship to Patient    ..................................................               ................................................  Date                                   Time    ..........................................................................................................................................  Reviewed by Signature/Title    ...................................................              ..............................................  Date                                               Time          22EPIC Rev 08/18        5

## 2023-04-22 ENCOUNTER — HEALTH MAINTENANCE LETTER (OUTPATIENT)
Age: 48
End: 2023-04-22

## 2023-07-15 ENCOUNTER — HEALTH MAINTENANCE LETTER (OUTPATIENT)
Age: 48
End: 2023-07-15

## 2023-09-23 ENCOUNTER — HEALTH MAINTENANCE LETTER (OUTPATIENT)
Age: 48
End: 2023-09-23

## 2024-09-07 ENCOUNTER — HEALTH MAINTENANCE LETTER (OUTPATIENT)
Age: 49
End: 2024-09-07